# Patient Record
Sex: MALE | Race: BLACK OR AFRICAN AMERICAN | NOT HISPANIC OR LATINO | URBAN - METROPOLITAN AREA
[De-identification: names, ages, dates, MRNs, and addresses within clinical notes are randomized per-mention and may not be internally consistent; named-entity substitution may affect disease eponyms.]

---

## 2020-07-01 ENCOUNTER — TELEPHONE ENCOUNTER (OUTPATIENT)
Dept: URBAN - METROPOLITAN AREA CLINIC 92 | Facility: CLINIC | Age: 30
End: 2020-07-01

## 2021-04-21 ENCOUNTER — TELEPHONE ENCOUNTER (OUTPATIENT)
Dept: URBAN - METROPOLITAN AREA CLINIC 17 | Facility: CLINIC | Age: 31
End: 2021-04-21

## 2021-04-21 RX ORDER — MESALAMINE 1.2 G/1
4 TABLETS TABLET, DELAYED RELEASE ORAL ONCE A DAY
Qty: 120 TABLETS | Refills: 0 | OUTPATIENT
Start: 2021-04-21 | End: 2021-05-21

## 2021-04-21 RX ORDER — PREDNISONE 10 MG/1
TAKE 4 TAB PO DAILY X2WKS, THEN 3 TAB PO DAILY X1WK, THEN 2 TAB PO DAILY X1WK, THEN 1TAB PO DAILY X1WK TABLET ORAL
Qty: 98 | Refills: 0 | Status: ACTIVE | COMMUNITY
Start: 2019-05-01 | End: 1900-01-01

## 2021-04-21 RX ORDER — METRONIDAZOLE 500 MG/1
TAKE 1 TABLET (500 MG) BY ORAL ROUTE 3 TIMES PER DAY TABLET ORAL
Qty: 42 | Refills: 0 | Status: ACTIVE | COMMUNITY
Start: 2019-05-01 | End: 1900-01-01

## 2021-05-17 ENCOUNTER — ERX REFILL RESPONSE (OUTPATIENT)
Dept: URBAN - METROPOLITAN AREA CLINIC 13 | Facility: CLINIC | Age: 31
End: 2021-05-17

## 2021-05-17 RX ORDER — MESALAMINE 1.2 G/1
TAKE FOUR TABLETS BY MOUTH DAILY TABLET, DELAYED RELEASE ORAL
Qty: 120 | Refills: 1

## 2021-05-24 ENCOUNTER — OFFICE VISIT (OUTPATIENT)
Dept: URBAN - METROPOLITAN AREA TELEHEALTH 2 | Facility: TELEHEALTH | Age: 31
End: 2021-05-24
Payer: COMMERCIAL

## 2021-05-24 ENCOUNTER — TELEPHONE ENCOUNTER (OUTPATIENT)
Dept: URBAN - METROPOLITAN AREA CLINIC 92 | Facility: CLINIC | Age: 31
End: 2021-05-24

## 2021-05-24 DIAGNOSIS — K51.80 CHRONIC PANCOLONIC ULCERATIVE COLITIS: ICD-10-CM

## 2021-05-24 PROCEDURE — 99213 OFFICE O/P EST LOW 20 MIN: CPT | Performed by: INTERNAL MEDICINE

## 2021-05-24 PROCEDURE — G9659 >85Y NO HX COLO CA/RSN SCOPE: HCPCS | Performed by: INTERNAL MEDICINE

## 2021-05-24 RX ORDER — MESALAMINE 1.2 G/1
TAKE FOUR TABLETS BY MOUTH DAILY TABLET, DELAYED RELEASE ORAL
Qty: 120 | Refills: 1

## 2021-05-24 RX ORDER — MESALAMINE 1.2 G/1
TAKE FOUR TABLETS BY MOUTH DAILY TABLET, DELAYED RELEASE ORAL
Qty: 120 | Refills: 1 | Status: ACTIVE | COMMUNITY

## 2021-05-24 RX ORDER — PREDNISONE 10 MG/1
TAKE 4 TAB PO DAILY X2WKS, THEN 3 TAB PO DAILY X1WK, THEN 2 TAB PO DAILY X1WK, THEN 1TAB PO DAILY X1WK TABLET ORAL
Qty: 98 | Refills: 0 | Status: DISCONTINUED | COMMUNITY
Start: 2019-05-01

## 2021-05-24 RX ORDER — SODIUM PICOSULFATE, MAGNESIUM OXIDE, AND ANHYDROUS CITRIC ACID 10; 3.5; 12 MG/160ML; G/160ML; G/160ML
160 ML LIQUID ORAL
Qty: 320 MILLILITER | Refills: 0 | OUTPATIENT
Start: 2021-05-24 | End: 2021-05-25

## 2021-05-24 RX ORDER — METRONIDAZOLE 500 MG/1
TAKE 1 TABLET (500 MG) BY ORAL ROUTE 3 TIMES PER DAY TABLET ORAL
Qty: 42 | Refills: 0 | Status: DISCONTINUED | COMMUNITY
Start: 2019-05-01

## 2021-05-24 NOTE — HPI-TODAY'S VISIT:
This is a 29 yo male with a PMHx of ulcerative colitis diagnosed by Dr. Fortune in 2019.   He has done well on Mesalamine 1.2 g 4 tablets once a day.  He denies abdominal pain, constipation and diarrhea.  He sees blood only if he misses the medication for a few days.  He denies joint pain, skin rash or ocular issues.

## 2022-07-14 ENCOUNTER — TELEPHONE ENCOUNTER (OUTPATIENT)
Dept: URBAN - METROPOLITAN AREA CLINIC 92 | Facility: CLINIC | Age: 32
End: 2022-07-14

## 2022-07-14 RX ORDER — MESALAMINE 1.2 G/1
TAKE FOUR TABLETS BY MOUTH DAILY TABLET, DELAYED RELEASE ORAL
Qty: 120 | Refills: 0
End: 2022-10-12

## 2022-09-16 ENCOUNTER — ERX REFILL RESPONSE (OUTPATIENT)
Dept: URBAN - METROPOLITAN AREA CLINIC 17 | Facility: CLINIC | Age: 32
End: 2022-09-16

## 2022-09-16 RX ORDER — MESALAMINE 1.2 G/1
TAKE FOUR TABLETS BY MOUTH DAILY TABLET, DELAYED RELEASE ORAL
Qty: 120 | Refills: 0 | OUTPATIENT

## 2022-10-06 ENCOUNTER — OFFICE VISIT (OUTPATIENT)
Dept: URBAN - METROPOLITAN AREA CLINIC 92 | Facility: CLINIC | Age: 32
End: 2022-10-06

## 2022-10-06 RX ORDER — MESALAMINE 1.2 G/1
TAKE FOUR TABLETS BY MOUTH DAILY TABLET, DELAYED RELEASE ORAL
Qty: 120 | Refills: 0 | COMMUNITY

## 2023-03-14 ENCOUNTER — TELEPHONE ENCOUNTER (OUTPATIENT)
Dept: URBAN - METROPOLITAN AREA CLINIC 17 | Facility: CLINIC | Age: 33
End: 2023-03-14

## 2023-03-14 RX ORDER — MESALAMINE 1.2 G/1
TAKE FOUR TABLETS BY MOUTH DAILY TABLET, DELAYED RELEASE ORAL
Qty: 120 | Refills: 0
End: 2023-04-13

## 2023-04-26 ENCOUNTER — TELEPHONE ENCOUNTER (OUTPATIENT)
Dept: URBAN - METROPOLITAN AREA CLINIC 35 | Facility: CLINIC | Age: 33
End: 2023-04-26

## 2023-04-26 ENCOUNTER — LAB OUTSIDE AN ENCOUNTER (OUTPATIENT)
Dept: URBAN - METROPOLITAN AREA TELEHEALTH 2 | Facility: TELEHEALTH | Age: 33
End: 2023-04-26

## 2023-04-26 ENCOUNTER — OFFICE VISIT (OUTPATIENT)
Dept: URBAN - METROPOLITAN AREA TELEHEALTH 2 | Facility: TELEHEALTH | Age: 33
End: 2023-04-26
Payer: COMMERCIAL

## 2023-04-26 ENCOUNTER — DASHBOARD ENCOUNTERS (OUTPATIENT)
Age: 33
End: 2023-04-26

## 2023-04-26 VITALS — WEIGHT: 220 LBS | HEIGHT: 69 IN | BODY MASS INDEX: 32.58 KG/M2

## 2023-04-26 DIAGNOSIS — K51.80 CHRONIC PANCOLONIC ULCERATIVE COLITIS: ICD-10-CM

## 2023-04-26 DIAGNOSIS — K59.01 CONSTIPATION BY DELAYED COLONIC TRANSIT: ICD-10-CM

## 2023-04-26 DIAGNOSIS — K62.5 RECTAL BLEEDING: ICD-10-CM

## 2023-04-26 PROBLEM — 35298007: Status: ACTIVE | Noted: 2023-04-26

## 2023-04-26 PROCEDURE — 99214 OFFICE O/P EST MOD 30 MIN: CPT | Performed by: INTERNAL MEDICINE

## 2023-04-26 RX ORDER — MESALAMINE 1.2 G/1
TABLET, DELAYED RELEASE ORAL
Qty: 120 TABLET | Status: ACTIVE | COMMUNITY

## 2023-04-26 RX ORDER — MESALAMINE 1.2 G/1
4 TABLETS WITH A MEAL TABLET, DELAYED RELEASE ORAL ONCE A DAY
Qty: 360 TABLET | Refills: 0
End: 2023-07-25

## 2023-04-26 NOTE — HPI-TODAY'S VISIT:
This is a 31 yo male with a PMHx of ulcerative pancolitis diagnosed by Dr. Fortune in 2019.   He has done well on Mesalamine 1.2 g 4 tablets once a day.  He denies abdominal pain, constipation and diarrhea.  He sees blood only if he misses the medication for a few days.  He denies joint pain, skin rash or ocular issues.  A colonoscopy scheduled following last visit 2021 was not done.  Today he reports 2 weeks of constipation and blood in his stools.  He deinies joint pain and visual issues.  He was low on his prescription so decreased mesalamine from4 4 tablets a day to 2 - 3 tablets.

## 2023-04-26 NOTE — HPI-OTHER HISTORIES
(May 2021) This is a 29 yo male with a PMHx of ulcerative colitis diagnosed by Dr. Fortune in 2019.   He has done well on Mesalamine 1.2 g 4 tablets once a day.  He denies abdominal pain, constipation and diarrhea.  He sees blood only if he misses the medication for a few days.  He denies joint pain, skin rash or ocular issues.

## 2023-08-17 ENCOUNTER — ERX REFILL RESPONSE (OUTPATIENT)
Dept: URBAN - METROPOLITAN AREA CLINIC 17 | Facility: CLINIC | Age: 33
End: 2023-08-17

## 2023-08-17 RX ORDER — MESALAMINE 1.2 G/1
TAKE FOUR TABLETS BY MOUTH DAILY TABLET, DELAYED RELEASE ORAL
Qty: 120 TABLET | Refills: 0 | OUTPATIENT

## 2024-07-01 ENCOUNTER — TELEPHONE ENCOUNTER (OUTPATIENT)
Dept: URBAN - METROPOLITAN AREA CLINIC 92 | Facility: CLINIC | Age: 34
End: 2024-07-01

## 2024-07-01 RX ORDER — MESALAMINE 1.2 G/1
TAKE FOUR TABLETS BY MOUTH DAILY TABLET, DELAYED RELEASE ORAL
Qty: 120 TABLET | Refills: 1

## 2024-07-08 ENCOUNTER — ERX REFILL RESPONSE (OUTPATIENT)
Dept: URBAN - METROPOLITAN AREA CLINIC 17 | Facility: CLINIC | Age: 34
End: 2024-07-08

## 2024-07-08 RX ORDER — MESALAMINE 1.2 G/1
TAKE FOUR TABLETS BY MOUTH DAILY TABLET, DELAYED RELEASE ORAL
Qty: 120 TABLET | Refills: 1 | OUTPATIENT

## 2024-07-10 ENCOUNTER — TELEPHONE ENCOUNTER (OUTPATIENT)
Dept: URBAN - METROPOLITAN AREA CLINIC 92 | Facility: CLINIC | Age: 34
End: 2024-07-10

## 2024-07-11 ENCOUNTER — OFFICE VISIT (OUTPATIENT)
Dept: URBAN - METROPOLITAN AREA CLINIC 92 | Facility: CLINIC | Age: 34
End: 2024-07-11
Payer: COMMERCIAL

## 2024-07-11 VITALS
WEIGHT: 210.2 LBS | BODY MASS INDEX: 31.13 KG/M2 | SYSTOLIC BLOOD PRESSURE: 131 MMHG | HEART RATE: 99 BPM | TEMPERATURE: 96.6 F | DIASTOLIC BLOOD PRESSURE: 84 MMHG | HEIGHT: 69 IN

## 2024-07-11 DIAGNOSIS — R63.4 WEIGHT LOSS, UNINTENTIONAL: ICD-10-CM

## 2024-07-11 DIAGNOSIS — K62.5 RECTAL BLEEDING: ICD-10-CM

## 2024-07-11 DIAGNOSIS — K51.90 ULCERATIVE COLITIS: ICD-10-CM

## 2024-07-11 DIAGNOSIS — R11.2 NAUSEA AND VOMITING IN ADULT: ICD-10-CM

## 2024-07-11 PROCEDURE — 99214 OFFICE O/P EST MOD 30 MIN: CPT | Performed by: INTERNAL MEDICINE

## 2024-07-11 RX ORDER — PREDNISONE 10 MG/1
TAKE 4 TABS PO X 1 WEEK, TAKE 3 TABS PO X 1 WEEK, TAKE 2 TABS PO X 1 WEEK, TAKE 1 TAB PO X 1 WEEK TABLET ORAL ONCE A DAY
Qty: 70 | Refills: 0 | OUTPATIENT
Start: 2024-07-11 | End: 2024-08-10

## 2024-07-11 RX ORDER — ONDANSETRON HYDROCHLORIDE 8 MG/1
1 TABLET AS NEEDED TABLET, FILM COATED ORAL ONCE A DAY
Qty: 30 | Refills: 0 | OUTPATIENT
Start: 2024-07-11

## 2024-07-11 RX ORDER — MESALAMINE 1.2 G/1
TAKE FOUR TABLETS BY MOUTH DAILY TABLET, DELAYED RELEASE ORAL
Qty: 120 TABLET | Refills: 1 | Status: ACTIVE | COMMUNITY

## 2024-07-11 NOTE — HPI-TODAY'S VISIT:
This is a 31 yo male with a PMHx of ulcerative pancolitis diagnosed by Dr. Fortune in 2019.   He has done well on Mesalamine 1.2 g 2 tablets once a day.  However he ran out the meds three months ago and because he was feeling well did not request a refill.   Two weeks ago he developed diarrhea and rectal bleeding.  Refill was called in and his symptoms have improved.  Today he reports frequency has decreased from every hour to every 3 hours.  He did not have the colonoscopy as recommended 2023 due to reluctance to have the exam.   He denies joint pain and ocular symptoms.    His sister has ulcerative colitis.

## 2024-07-11 NOTE — PHYSICAL EXAM RECTAL:
normal tone, no external hemorrhoids, no masses palpable, no red blood, Tenderness on GUS, Internal hemorrhoids present

## 2024-07-12 LAB
ALBUMIN/GLOBULIN RATIO: 1.5
ALBUMIN: 4.5
ALKALINE PHOSPHATASE: 72
ALT: 12
AST: 13
BILIRUBIN, TOTAL: 0.4
BUN/CREATININE RATIO: (no result)
C-REACTIVE PROTEIN, QUANT: 84.8
CALCIUM: 9.5
CARBON DIOXIDE: 23
CHLORIDE: 92
CREATININE: 1.2
GLOBULIN: 3.1
GLUCOSE: 75
HEMATOCRIT: 37.5
HEMOGLOBIN: 12.2
MCH: 27.6
MCHC: 32.5
MCV: 84.8
MPV: 9.5
PLATELET COUNT: 512
POTASSIUM: 4.3
PROTEIN, TOTAL: 7.6
RDW: 14.2
RED BLOOD CELL COUNT: 4.42
SODIUM: 133
UREA NITROGEN (BUN): 9
VITAMIN D,25-OH,TOTAL,IA: 22
WHITE BLOOD CELL COUNT: 11.3

## 2024-07-15 ENCOUNTER — CLAIMS CREATED FROM THE CLAIM WINDOW (OUTPATIENT)
Dept: URBAN - METROPOLITAN AREA CLINIC 4 | Facility: CLINIC | Age: 34
End: 2024-07-15
Payer: COMMERCIAL

## 2024-07-15 ENCOUNTER — CLAIMS CREATED FROM THE CLAIM WINDOW (OUTPATIENT)
Dept: URBAN - METROPOLITAN AREA SURGERY CENTER 16 | Facility: SURGERY CENTER | Age: 34
End: 2024-07-15
Payer: COMMERCIAL

## 2024-07-15 ENCOUNTER — OFFICE VISIT (OUTPATIENT)
Dept: URBAN - METROPOLITAN AREA SURGERY CENTER 16 | Facility: SURGERY CENTER | Age: 34
End: 2024-07-15

## 2024-07-15 ENCOUNTER — TELEPHONE ENCOUNTER (OUTPATIENT)
Dept: URBAN - METROPOLITAN AREA CLINIC 17 | Facility: CLINIC | Age: 34
End: 2024-07-15

## 2024-07-15 DIAGNOSIS — K51.919 ULCERATIVE COLITIS, UNSPECIFIED WITH UNSPECIFIED COMPLICATIONS: ICD-10-CM

## 2024-07-15 DIAGNOSIS — R19.7 DIARRHEA: ICD-10-CM

## 2024-07-15 DIAGNOSIS — K62.5 RECTAL BLEEDING: ICD-10-CM

## 2024-07-15 DIAGNOSIS — K51.90 ULCERATIVE COLITIS: ICD-10-CM

## 2024-07-15 DIAGNOSIS — K63.89 OTHER SPECIFIED DISEASES OF INTESTINE: ICD-10-CM

## 2024-07-15 PROBLEM — 34713006: Status: ACTIVE | Noted: 2024-07-15

## 2024-07-15 PROBLEM — 444548001: Status: ACTIVE | Noted: 2024-07-15

## 2024-07-15 PROCEDURE — 88342 IMHCHEM/IMCYTCHM 1ST ANTB: CPT | Performed by: PATHOLOGY

## 2024-07-15 PROCEDURE — 88305 TISSUE EXAM BY PATHOLOGIST: CPT | Performed by: PATHOLOGY

## 2024-07-15 PROCEDURE — 00811 ANES LWR INTST NDSC NOS: CPT | Performed by: ANESTHESIOLOGIST ASSISTANT

## 2024-07-15 PROCEDURE — 00811 ANES LWR INTST NDSC NOS: CPT | Performed by: ANESTHESIOLOGY

## 2024-07-15 RX ORDER — ONDANSETRON HYDROCHLORIDE 8 MG/1
1 TABLET AS NEEDED TABLET, FILM COATED ORAL ONCE A DAY
Qty: 30 | Refills: 0 | Status: ACTIVE | COMMUNITY
Start: 2024-07-11

## 2024-07-15 RX ORDER — MESALAMINE 1.2 G/1
TAKE FOUR TABLETS BY MOUTH DAILY TABLET, DELAYED RELEASE ORAL
Qty: 120 TABLET | Refills: 1 | Status: ACTIVE | COMMUNITY

## 2024-07-15 RX ORDER — PREDNISONE 10 MG/1
TAKE 4 TABS PO X 1 WEEK, TAKE 3 TABS PO X 1 WEEK, TAKE 2 TABS PO X 1 WEEK, TAKE 1 TAB PO X 1 WEEK TABLET ORAL ONCE A DAY
Qty: 70 | Refills: 0 | Status: ACTIVE | COMMUNITY
Start: 2024-07-11 | End: 2024-08-10

## 2024-07-15 RX ORDER — ERGOCALCIFEROL 1.25 MG/1
1 CAPSULE CAPSULE ORAL
Qty: 14 | Refills: 0 | OUTPATIENT
Start: 2024-07-15 | End: 2024-10-13

## 2024-07-16 ENCOUNTER — TELEPHONE ENCOUNTER (OUTPATIENT)
Dept: URBAN - METROPOLITAN AREA CLINIC 105 | Facility: CLINIC | Age: 34
End: 2024-07-16

## 2024-07-19 ENCOUNTER — CLAIMS CREATED FROM THE CLAIM WINDOW (OUTPATIENT)
Dept: URBAN - METROPOLITAN AREA MEDICAL CENTER 12 | Facility: MEDICAL CENTER | Age: 34
End: 2024-07-19
Payer: COMMERCIAL

## 2024-07-19 DIAGNOSIS — R19.7 ACUTE DIARRHEA: ICD-10-CM

## 2024-07-19 DIAGNOSIS — R11.2 ACUTE NAUSEA WITH NONBILIOUS VOMITING: ICD-10-CM

## 2024-07-19 DIAGNOSIS — D50.9 ANEMIA: ICD-10-CM

## 2024-07-19 DIAGNOSIS — K51.018 CHRONIC ULCERATIVE ENTEROCOLITIS WITH OTHER COMPLICATION: ICD-10-CM

## 2024-07-19 PROCEDURE — 99254 IP/OBS CNSLTJ NEW/EST MOD 60: CPT | Performed by: INTERNAL MEDICINE

## 2024-07-19 PROCEDURE — 99222 1ST HOSP IP/OBS MODERATE 55: CPT | Performed by: INTERNAL MEDICINE

## 2024-07-19 PROCEDURE — G8427 DOCREV CUR MEDS BY ELIG CLIN: HCPCS | Performed by: INTERNAL MEDICINE

## 2024-07-20 ENCOUNTER — CLAIMS CREATED FROM THE CLAIM WINDOW (OUTPATIENT)
Dept: URBAN - METROPOLITAN AREA MEDICAL CENTER 12 | Facility: MEDICAL CENTER | Age: 34
End: 2024-07-20
Payer: COMMERCIAL

## 2024-07-20 DIAGNOSIS — R19.7 ACUTE DIARRHEA: ICD-10-CM

## 2024-07-20 DIAGNOSIS — K51.018 CHRONIC ULCERATIVE ENTEROCOLITIS WITH OTHER COMPLICATION: ICD-10-CM

## 2024-07-20 DIAGNOSIS — D50.9 ANEMIA: ICD-10-CM

## 2024-07-20 DIAGNOSIS — R11.2 ACUTE NAUSEA WITH NONBILIOUS VOMITING: ICD-10-CM

## 2024-07-20 PROCEDURE — 99232 SBSQ HOSP IP/OBS MODERATE 35: CPT | Performed by: INTERNAL MEDICINE

## 2024-08-01 ENCOUNTER — OFFICE VISIT (OUTPATIENT)
Dept: URBAN - METROPOLITAN AREA CLINIC 92 | Facility: CLINIC | Age: 34
End: 2024-08-01
Payer: COMMERCIAL

## 2024-08-01 VITALS
HEART RATE: 91 BPM | WEIGHT: 182.4 LBS | HEIGHT: 69 IN | TEMPERATURE: 97.2 F | DIASTOLIC BLOOD PRESSURE: 82 MMHG | BODY MASS INDEX: 27.02 KG/M2 | SYSTOLIC BLOOD PRESSURE: 132 MMHG

## 2024-08-01 DIAGNOSIS — K51.00 ULCERATIVE PANCOLITIS WITHOUT COMPLICATION: ICD-10-CM

## 2024-08-01 DIAGNOSIS — E55.9 VITAMIN D DEFICIENCY: ICD-10-CM

## 2024-08-01 DIAGNOSIS — R11.0 NAUSEA: ICD-10-CM

## 2024-08-01 PROCEDURE — 99214 OFFICE O/P EST MOD 30 MIN: CPT

## 2024-08-01 RX ORDER — PREDNISONE 10 MG/1
4 TABLETS ONCE A DAY FOR 14 DAYS, 3 TABLETS ONCE A DAY FOR 14 DAYS, 2 TABLETS ONCE A DAY FOR 14 DAYS, 1 TABLET ONCE A DAY FOR 14 DAYS TABLET ORAL ONCE A DAY
Qty: 140 | Refills: 0
Start: 2024-07-11 | End: 2024-08-31

## 2024-08-01 RX ORDER — ONDANSETRON HYDROCHLORIDE 8 MG/1
1 TABLET AS NEEDED TABLET, FILM COATED ORAL ONCE A DAY
Qty: 30 | Refills: 0 | Status: ACTIVE | COMMUNITY
Start: 2024-07-11

## 2024-08-01 RX ORDER — PREDNISONE 10 MG/1
TAKE 4 TABS PO X 1 WEEK, TAKE 3 TABS PO X 1 WEEK, TAKE 2 TABS PO X 1 WEEK, TAKE 1 TAB PO X 1 WEEK TABLET ORAL ONCE A DAY
Qty: 70 | Refills: 0 | Status: ACTIVE | COMMUNITY
Start: 2024-07-11 | End: 2024-08-10

## 2024-08-01 RX ORDER — MESALAMINE 1.2 G/1
4 TABLETS WITH MEAL TABLET, DELAYED RELEASE ORAL ONCE A DAY
Qty: 360 TABLET | Refills: 3

## 2024-08-01 RX ORDER — ERGOCALCIFEROL 1.25 MG/1
1 CAPSULE CAPSULE ORAL
Qty: 14 | Refills: 0 | Status: ACTIVE | COMMUNITY
Start: 2024-07-15 | End: 2024-10-13

## 2024-08-01 RX ORDER — ONDANSETRON 4 MG/1
1 TABLET ON THE TONGUE AND ALLOW TO DISSOLVE TABLET, ORALLY DISINTEGRATING ORAL
Qty: 30 TABLET | Refills: 3 | OUTPATIENT
Start: 2024-08-01

## 2024-08-01 RX ORDER — MESALAMINE 1.2 G/1
TAKE FOUR TABLETS BY MOUTH DAILY TABLET, DELAYED RELEASE ORAL
Qty: 120 TABLET | Refills: 1 | Status: ACTIVE | COMMUNITY

## 2024-08-01 NOTE — HPI-TODAY'S VISIT:
32 yo male with a PMHx of ulcerative pancolitis diagnosed by Dr. Fortune in 2019.   He has done well on Mesalamine 1.2 g 2 tablets once a day. Saw Dr Oliveros on 7/11/24. Noted he ran out the meds three months ago and because he was feeling well did not request a refill.   Two weeks prior to seeing Dr Oliveros he developed diarrhea and rectal bleeding.  Refill was called in and his symptoms have improved.  After last visit a colonoscopy was obtained on 7- that demonstrated pancolitis that was severe with significant looping of the colon scope could not be passed beyond the proximal transverse colon.  Biopsies demonstrated diffuse chronic active colitis in the descending, sigmoid and rectum. Recall 6 months.   Labs demonstrated WBC 11.3, hemoglobin 12.2, hematocrit 37.5, MCV 84.8, vitamin D 22, CRP 84.8, overall normal CMP.  After procedure patient started to have nausea, vomiting and diarrhea.  He presented to [facility] for this. During admission patient had stool studies which r/o infection. Calpro was >3000. He was given IV steriods. Was d/c on oral steroids 40mg and told to f/u with AGA for taper. He was also placed on mesalamine suppositories nightly.  Today he notes that he just got his suppostiories 3 days ago due to pharmacy issues. He thoguht he was to d/c meslamine oral tablets after he got this so he has not been taking meslamine oral x 3 days. He has BM almost every hour. Denies any BRBPR or melena. He has abd pain only when he is hungry. He has lost 30 pounds in the past 3 weeks. He has been having nausea in the morning and using zofran prn. His sister has ulcerative colitis. He denies joint pain and ocular symptoms.

## 2024-08-12 ENCOUNTER — TELEPHONE ENCOUNTER (OUTPATIENT)
Dept: URBAN - METROPOLITAN AREA CLINIC 92 | Facility: CLINIC | Age: 34
End: 2024-08-12

## 2024-09-12 ENCOUNTER — OFFICE VISIT (OUTPATIENT)
Dept: URBAN - METROPOLITAN AREA CLINIC 92 | Facility: CLINIC | Age: 34
End: 2024-09-12
Payer: COMMERCIAL

## 2024-09-12 VITALS
HEIGHT: 69 IN | HEART RATE: 125 BPM | TEMPERATURE: 98.2 F | BODY MASS INDEX: 24.14 KG/M2 | SYSTOLIC BLOOD PRESSURE: 126 MMHG | WEIGHT: 163 LBS | DIASTOLIC BLOOD PRESSURE: 74 MMHG

## 2024-09-12 DIAGNOSIS — E55.9 VITAMIN D DEFICIENCY: ICD-10-CM

## 2024-09-12 DIAGNOSIS — K51.00 ULCERATIVE PANCOLITIS WITHOUT COMPLICATION: ICD-10-CM

## 2024-09-12 DIAGNOSIS — K64.8 OTHER HEMORRHOIDS: ICD-10-CM

## 2024-09-12 DIAGNOSIS — R11.0 NAUSEA: ICD-10-CM

## 2024-09-12 PROCEDURE — 99214 OFFICE O/P EST MOD 30 MIN: CPT

## 2024-09-12 RX ORDER — ERGOCALCIFEROL 1.25 MG/1
1 CAPSULE CAPSULE ORAL
Qty: 14 | Refills: 0 | Status: ACTIVE | COMMUNITY
Start: 2024-07-15 | End: 2024-10-13

## 2024-09-12 RX ORDER — HYDROCORTISONE ACETATE 25 MG/1
1 SUPPOSITORY SUPPOSITORY RECTAL ONCE A DAY
Qty: 14 | Refills: 3 | OUTPATIENT
Start: 2024-09-12 | End: 2024-11-06

## 2024-09-12 RX ORDER — ONDANSETRON 4 MG/1
1 TABLET ON THE TONGUE AND ALLOW TO DISSOLVE TABLET, ORALLY DISINTEGRATING ORAL
Qty: 30 TABLET | Refills: 3 | OUTPATIENT

## 2024-09-12 RX ORDER — MESALAMINE 1.2 G/1
4 TABLETS WITH MEAL TABLET, DELAYED RELEASE ORAL ONCE A DAY
Qty: 360 TABLET | Refills: 3 | Status: ACTIVE | COMMUNITY

## 2024-09-12 RX ORDER — ONDANSETRON HYDROCHLORIDE 8 MG/1
1 TABLET AS NEEDED TABLET, FILM COATED ORAL ONCE A DAY
Qty: 30 | Refills: 0 | Status: ACTIVE | COMMUNITY
Start: 2024-07-11

## 2024-09-12 RX ORDER — RISANKIZUMAB-RZAA 60 MG/ML
AS DIRECTED INJECTION INTRAVENOUS
Qty: 1200 | Refills: 0 | OUTPATIENT
Start: 2024-09-12 | End: 2024-09-12

## 2024-09-12 RX ORDER — PREDNISONE 10 MG/1
3 TABLETS ONCE A DAY FOR 14 DAYS, 2 TABLETS ONCE A DAY FOR 14 DAYS, 1 TABLET ONCE A DAY FOR 14 DAYS TABLET ORAL ONCE A DAY
Qty: 84 | Refills: 0 | OUTPATIENT
Start: 2024-09-12 | End: 2024-10-24

## 2024-09-12 RX ORDER — MESALAMINE 1.2 G/1
4 TABLETS WITH MEAL TABLET, DELAYED RELEASE ORAL ONCE A DAY
Qty: 360 TABLET | Refills: 3

## 2024-09-12 RX ORDER — ONDANSETRON 4 MG/1
1 TABLET ON THE TONGUE AND ALLOW TO DISSOLVE TABLET, ORALLY DISINTEGRATING ORAL
Qty: 30 TABLET | Refills: 3 | Status: ACTIVE | COMMUNITY
Start: 2024-08-01

## 2024-09-12 RX ORDER — RISANKIZUMAB-RZAA 360 MG/2.4
AT WEEK 12 WEARABLE INJECTOR SUBCUTANEOUS
Qty: 360 | Refills: 0 | OUTPATIENT
Start: 2024-09-12

## 2024-09-12 NOTE — HPI-TODAY'S VISIT:
33 yo male with a PMHx of ulcerative pancolitis diagnosed by Dr. Fortune in 2019.   He has done well on Mesalamine 1.2 g 2 tablets once a day. Saw Dr Oliveros on 7/11/24. Noted he ran out the meds three months ago and because he was feeling well did not request a refill.   Two weeks prior to seeing Dr Oliveros he developed diarrhea and rectal bleeding.  Refill was called in and his symptoms have improved.  After last visit a colonoscopy was obtained on 7- that demonstrated pancolitis that was severe with significant looping of the colon scope could not be passed beyond the proximal transverse colon.  Biopsies demonstrated diffuse chronic active colitis in the descending, sigmoid and rectum. Recall 6 months.   Labs demonstrated WBC 11.3, hemoglobin 12.2, hematocrit 37.5, MCV 84.8, vitamin D 22, CRP 84.8, overall normal CMP.  After procedure patient started to have nausea, vomiting and diarrhea.  He presented to [facility] for this. During admission patient had stool studies which r/o infection. Calpro was >3000. He was given IV steriods. Was d/c on oral steroids 40mg and told to f/u with AGA for taper. He was also placed on mesalamine suppositories nightly.   Today he notes that he has been on mesalamine 4 tabs daily along with mesalamine suppositories. He is also on prednisine 40mg daily. He did not redice to a taper since he did not know he was too do this. His BM are better but still having a BM every 3-4 hours and t/o the night. He denies any rectal bleeding, melena, fevers or chills. He has generalzied abd pain that is worse with eating. He has been using tylenol which helps. He has lost 50 pounds since july. He is able to eat and has an appetitie. He has been having nausea in the morning and using zofran prn. His sister has ulcerative colitis. He denies joint pain and ocular symptoms. He is not up to date with his vaccines.

## 2024-09-13 ENCOUNTER — TELEPHONE ENCOUNTER (OUTPATIENT)
Dept: URBAN - METROPOLITAN AREA CLINIC 23 | Facility: CLINIC | Age: 34
End: 2024-09-13

## 2024-09-17 ENCOUNTER — TELEPHONE ENCOUNTER (OUTPATIENT)
Dept: URBAN - METROPOLITAN AREA CLINIC 92 | Facility: CLINIC | Age: 34
End: 2024-09-17

## 2024-09-17 LAB
A/G RATIO: 0.9
ABSOLUTE BASOPHILS: 16
ABSOLUTE EOSINOPHILS: 64
ABSOLUTE LYMPHOCYTES: 1145
ABSOLUTE MONOCYTES: 1034
ABSOLUTE NEUTROPHILS: (no result)
ALBUMIN: 2.8
ALKALINE PHOSPHATASE: 138
ALT (SGPT): 18
AST (SGOT): 9
BASOPHILS: 0.1
BILIRUBIN, TOTAL: 0.5
BUN/CREATININE RATIO: (no result)
BUN: 11
CALCIUM: 8.4
CARBON DIOXIDE, TOTAL: 26
CBC MORPHOLOGY: (no result)
CHLORIDE: 98
CREATININE: 0.6
EGFR: 129
EOSINOPHILS: 0.4
GLOBULIN, TOTAL: 3
GLUCOSE: 83
HEMATOCRIT: 22.6
HEMOGLOBIN: 6.3
HEPATITIS B CORE AB TOTAL: REACTIVE
HEPATITIS B SURFACE AB IMMUNITY, QN: 337
HEPATITIS B SURFACE ANTIGEN: (no result)
LYMPHOCYTES: 7.2
MCH: 22.8
MCHC: 27.9
MCV: 81.9
MITOGEN-NIL: 0.02
MONOCYTES: 6.5
MPV: 9.2
NEUTROPHILS: 85.8
PLATELET COUNT: 910
POTASSIUM: 4.6
PROTEIN, TOTAL: 5.8
QUANTIFERON NIL VALUE: 0.02
QUANTIFERON TB1 AG VALUE: 0
QUANTIFERON TB2 AG VALUE: <0
QUANTIFERON-TB GOLD PLUS: (no result)
RDW: 17.6
RED BLOOD CELL COUNT: 2.76
SODIUM: 135
WHITE BLOOD CELL COUNT: 15.9

## 2024-09-19 LAB — T-SPOT.TB: (no result)

## 2024-09-20 ENCOUNTER — LAB OUTSIDE AN ENCOUNTER (OUTPATIENT)
Dept: URBAN - METROPOLITAN AREA CLINIC 92 | Facility: CLINIC | Age: 34
End: 2024-09-20

## 2024-09-20 ENCOUNTER — TELEPHONE ENCOUNTER (OUTPATIENT)
Dept: URBAN - METROPOLITAN AREA CLINIC 92 | Facility: CLINIC | Age: 34
End: 2024-09-20

## 2024-09-24 ENCOUNTER — TELEPHONE ENCOUNTER (OUTPATIENT)
Dept: URBAN - METROPOLITAN AREA CLINIC 23 | Facility: CLINIC | Age: 34
End: 2024-09-24

## 2024-09-25 ENCOUNTER — OFFICE VISIT (OUTPATIENT)
Dept: URBAN - METROPOLITAN AREA TELEHEALTH 2 | Facility: TELEHEALTH | Age: 34
End: 2024-09-25

## 2024-10-02 ENCOUNTER — OFFICE VISIT (OUTPATIENT)
Dept: URBAN - METROPOLITAN AREA CLINIC 97 | Facility: CLINIC | Age: 34
End: 2024-10-02

## 2024-10-02 ENCOUNTER — TELEPHONE ENCOUNTER (OUTPATIENT)
Dept: URBAN - METROPOLITAN AREA CLINIC 96 | Facility: CLINIC | Age: 34
End: 2024-10-02

## 2024-10-02 VITALS
HEART RATE: 97 BPM | WEIGHT: 151 LBS | SYSTOLIC BLOOD PRESSURE: 99 MMHG | RESPIRATION RATE: 18 BRPM | BODY MASS INDEX: 22.36 KG/M2 | TEMPERATURE: 98.3 F | DIASTOLIC BLOOD PRESSURE: 68 MMHG | HEIGHT: 69 IN

## 2024-10-02 DIAGNOSIS — K51.90 ULCERATIVE COLITIS: ICD-10-CM

## 2024-10-02 PROCEDURE — 96415 CHEMO IV INFUSION ADDL HR: CPT | Performed by: INTERNAL MEDICINE

## 2024-10-02 PROCEDURE — 96413 CHEMO IV INFUSION 1 HR: CPT | Performed by: INTERNAL MEDICINE

## 2024-10-02 RX ORDER — HYDROCORTISONE ACETATE 25 MG/1
1 SUPPOSITORY SUPPOSITORY RECTAL ONCE A DAY
Qty: 14 | Refills: 3 | Status: ACTIVE | COMMUNITY
Start: 2024-09-12 | End: 2024-11-06

## 2024-10-02 RX ORDER — RISANKIZUMAB-RZAA 360 MG/2.4
AT WEEK 12 WEARABLE INJECTOR SUBCUTANEOUS
Qty: 360 | Refills: 0 | Status: ACTIVE | COMMUNITY
Start: 2024-09-12

## 2024-10-02 RX ORDER — PREDNISONE 10 MG/1
3 TABLETS ONCE A DAY FOR 14 DAYS, 2 TABLETS ONCE A DAY FOR 14 DAYS, 1 TABLET ONCE A DAY FOR 14 DAYS TABLET ORAL ONCE A DAY
Qty: 84 | Refills: 0 | Status: ACTIVE | COMMUNITY
Start: 2024-09-12 | End: 2024-10-24

## 2024-10-02 RX ORDER — ERGOCALCIFEROL 1.25 MG/1
1 CAPSULE CAPSULE ORAL
Qty: 14 | Refills: 0 | Status: ACTIVE | COMMUNITY
Start: 2024-07-15 | End: 2024-10-13

## 2024-10-02 RX ORDER — ONDANSETRON HYDROCHLORIDE 8 MG/1
1 TABLET AS NEEDED TABLET, FILM COATED ORAL ONCE A DAY
Qty: 30 | Refills: 0 | Status: ACTIVE | COMMUNITY
Start: 2024-07-11

## 2024-10-02 RX ORDER — MESALAMINE 1.2 G/1
4 TABLETS WITH MEAL TABLET, DELAYED RELEASE ORAL ONCE A DAY
Qty: 360 TABLET | Refills: 3 | Status: ACTIVE | COMMUNITY

## 2024-10-02 RX ORDER — ONDANSETRON 4 MG/1
1 TABLET ON THE TONGUE AND ALLOW TO DISSOLVE TABLET, ORALLY DISINTEGRATING ORAL
Qty: 30 TABLET | Refills: 3 | Status: ACTIVE | COMMUNITY

## 2024-10-04 ENCOUNTER — TELEPHONE ENCOUNTER (OUTPATIENT)
Dept: URBAN - METROPOLITAN AREA CLINIC 92 | Facility: CLINIC | Age: 34
End: 2024-10-04

## 2024-10-17 ENCOUNTER — OFFICE VISIT (OUTPATIENT)
Dept: URBAN - METROPOLITAN AREA CLINIC 92 | Facility: CLINIC | Age: 34
End: 2024-10-17

## 2024-10-17 RX ORDER — HYDROCORTISONE ACETATE 25 MG/1
1 SUPPOSITORY SUPPOSITORY RECTAL ONCE A DAY
Qty: 14 | Refills: 3 | COMMUNITY
Start: 2024-09-12 | End: 2024-11-06

## 2024-10-17 RX ORDER — ONDANSETRON HYDROCHLORIDE 8 MG/1
1 TABLET AS NEEDED TABLET, FILM COATED ORAL ONCE A DAY
Qty: 30 | Refills: 0 | COMMUNITY
Start: 2024-07-11

## 2024-10-17 RX ORDER — PREDNISONE 10 MG/1
3 TABLETS ONCE A DAY FOR 14 DAYS, 2 TABLETS ONCE A DAY FOR 14 DAYS, 1 TABLET ONCE A DAY FOR 14 DAYS TABLET ORAL ONCE A DAY
Qty: 84 | Refills: 0 | COMMUNITY
Start: 2024-09-12 | End: 2024-10-24

## 2024-10-17 RX ORDER — RISANKIZUMAB-RZAA 360 MG/2.4
AT WEEK 12 WEARABLE INJECTOR SUBCUTANEOUS
Qty: 360 | Refills: 0 | COMMUNITY
Start: 2024-09-12

## 2024-10-17 RX ORDER — ONDANSETRON 4 MG/1
1 TABLET ON THE TONGUE AND ALLOW TO DISSOLVE TABLET, ORALLY DISINTEGRATING ORAL
Qty: 30 TABLET | Refills: 3 | COMMUNITY

## 2024-10-17 RX ORDER — MESALAMINE 1.2 G/1
4 TABLETS WITH MEAL TABLET, DELAYED RELEASE ORAL ONCE A DAY
Qty: 360 TABLET | Refills: 3 | COMMUNITY

## 2024-10-17 NOTE — HPI-TODAY'S VISIT:
33 yo male with a PMHx of ulcerative pancolitis diagnosed by Dr. Fortune in 2019 here for follow up..   He had done well on Mesalamine 1.2 g 2 tablets once a day. but ran out the meds June 2024 and because he was feeling well did not request a refill. He cam e to the office July 2024 wiht a severe flare.   A colonoscopy was obtained on 7- that demonstrated pancolitis that was severe with significant looping of the colon scope could not be passed beyond the proximal transverse colon.  Biopsies demonstrated diffuse chronic active colitis in the descending, sigmoid and rectum. Recall 6 months.   Labs demonstrated WBC 11.3, hemoglobin 12.2, hematocrit 37.5, MCV 84.8, vitamin D 22, CRP 84.8, overall normal CMP.  After procedure patient started to have nausea, vomiting and diarrhea.  He presented to [facility] for this. During admission patient had stool studies which r/o infection. Calpro was >3000. He was given IV steriods. Was d/c on oral steroids 40mg and told to f/u with AGA for taper. He was also placed on mesalamine suppositories nightly.   Today he notes that he has been on mesalamine 4 tabs daily along with mesalamine suppositories. He is also on prednisine 40mg daily. He did not redice to a taper since he did not know he was too do this. His BM are better but still having a BM every 3-4 hours and t/o the night. He denies any rectal bleeding, melena, fevers or chills. He has generalzied abd pain that is worse with eating. He has been using tylenol which helps. He has lost 50 pounds since july. He is able to eat and has an appetitie. He has been having nausea in the morning and using zofran prn. His sister has ulcerative colitis. He denies joint pain and ocular symptoms. He is not up to date with his vaccines.

## 2024-10-24 ENCOUNTER — OFFICE VISIT (OUTPATIENT)
Dept: URBAN - METROPOLITAN AREA CLINIC 92 | Facility: CLINIC | Age: 34
End: 2024-10-24

## 2024-10-30 ENCOUNTER — OFFICE VISIT (OUTPATIENT)
Dept: URBAN - METROPOLITAN AREA CLINIC 97 | Facility: CLINIC | Age: 34
End: 2024-10-30

## 2024-11-12 ENCOUNTER — TELEPHONE ENCOUNTER (OUTPATIENT)
Dept: URBAN - METROPOLITAN AREA CLINIC 97 | Facility: CLINIC | Age: 34
End: 2024-11-12

## 2024-11-13 ENCOUNTER — TELEPHONE ENCOUNTER (OUTPATIENT)
Dept: URBAN - METROPOLITAN AREA CLINIC 97 | Facility: CLINIC | Age: 34
End: 2024-11-13

## 2024-11-20 ENCOUNTER — TELEPHONE ENCOUNTER (OUTPATIENT)
Dept: URBAN - METROPOLITAN AREA CLINIC 92 | Facility: CLINIC | Age: 34
End: 2024-11-20

## 2024-11-25 ENCOUNTER — TELEPHONE ENCOUNTER (OUTPATIENT)
Dept: URBAN - METROPOLITAN AREA CLINIC 92 | Facility: CLINIC | Age: 34
End: 2024-11-25

## 2024-11-25 RX ORDER — RISANKIZUMAB-RZAA 360 MG/2.4
AT WEEK 12 WEARABLE INJECTOR SUBCUTANEOUS
Qty: 360 | Refills: 0
Start: 2024-09-12

## 2024-11-27 ENCOUNTER — OFFICE VISIT (OUTPATIENT)
Dept: URBAN - METROPOLITAN AREA CLINIC 97 | Facility: CLINIC | Age: 34
End: 2024-11-27

## 2024-11-29 ENCOUNTER — TELEPHONE ENCOUNTER (OUTPATIENT)
Dept: URBAN - METROPOLITAN AREA CLINIC 128 | Facility: CLINIC | Age: 34
End: 2024-11-29

## 2024-12-04 ENCOUNTER — TELEPHONE ENCOUNTER (OUTPATIENT)
Dept: URBAN - METROPOLITAN AREA CLINIC 17 | Facility: CLINIC | Age: 34
End: 2024-12-04

## 2024-12-05 ENCOUNTER — TELEPHONE ENCOUNTER (OUTPATIENT)
Dept: URBAN - METROPOLITAN AREA CLINIC 98 | Facility: CLINIC | Age: 34
End: 2024-12-05

## 2024-12-09 ENCOUNTER — TELEPHONE ENCOUNTER (OUTPATIENT)
Dept: URBAN - METROPOLITAN AREA CLINIC 92 | Facility: CLINIC | Age: 34
End: 2024-12-09

## 2024-12-19 ENCOUNTER — TELEPHONE ENCOUNTER (OUTPATIENT)
Dept: URBAN - METROPOLITAN AREA CLINIC 98 | Facility: CLINIC | Age: 34
End: 2024-12-19

## 2025-01-05 ENCOUNTER — TELEPHONE ENCOUNTER (OUTPATIENT)
Dept: URBAN - METROPOLITAN AREA CLINIC 17 | Facility: CLINIC | Age: 35
End: 2025-01-05